# Patient Record
Sex: MALE | Race: WHITE
[De-identification: names, ages, dates, MRNs, and addresses within clinical notes are randomized per-mention and may not be internally consistent; named-entity substitution may affect disease eponyms.]

---

## 2018-03-21 ENCOUNTER — HOSPITAL ENCOUNTER (OUTPATIENT)
Dept: HOSPITAL 17 - PHED | Age: 63
Setting detail: OBSERVATION
LOS: 2 days | Discharge: HOME | End: 2018-03-23
Attending: HOSPITALIST | Admitting: HOSPITALIST
Payer: COMMERCIAL

## 2018-03-21 VITALS — WEIGHT: 135.58 LBS | BODY MASS INDEX: 24.02 KG/M2 | HEIGHT: 63 IN

## 2018-03-21 VITALS
OXYGEN SATURATION: 98 % | SYSTOLIC BLOOD PRESSURE: 140 MMHG | RESPIRATION RATE: 18 BRPM | HEART RATE: 66 BPM | TEMPERATURE: 97.3 F | DIASTOLIC BLOOD PRESSURE: 83 MMHG

## 2018-03-21 VITALS
HEART RATE: 66 BPM | OXYGEN SATURATION: 98 % | SYSTOLIC BLOOD PRESSURE: 124 MMHG | RESPIRATION RATE: 18 BRPM | DIASTOLIC BLOOD PRESSURE: 78 MMHG

## 2018-03-21 VITALS — OXYGEN SATURATION: 98 %

## 2018-03-21 VITALS
OXYGEN SATURATION: 97 % | RESPIRATION RATE: 18 BRPM | SYSTOLIC BLOOD PRESSURE: 126 MMHG | DIASTOLIC BLOOD PRESSURE: 89 MMHG | HEART RATE: 66 BPM

## 2018-03-21 DIAGNOSIS — R13.10: ICD-10-CM

## 2018-03-21 DIAGNOSIS — E78.00: ICD-10-CM

## 2018-03-21 DIAGNOSIS — R22.1: ICD-10-CM

## 2018-03-21 DIAGNOSIS — Z82.49: ICD-10-CM

## 2018-03-21 DIAGNOSIS — R94.31: ICD-10-CM

## 2018-03-21 DIAGNOSIS — E78.5: ICD-10-CM

## 2018-03-21 DIAGNOSIS — K14.8: ICD-10-CM

## 2018-03-21 DIAGNOSIS — F17.290: ICD-10-CM

## 2018-03-21 DIAGNOSIS — I45.9: ICD-10-CM

## 2018-03-21 DIAGNOSIS — K08.89: ICD-10-CM

## 2018-03-21 DIAGNOSIS — Z83.3: ICD-10-CM

## 2018-03-21 DIAGNOSIS — I63.9: Primary | ICD-10-CM

## 2018-03-21 DIAGNOSIS — M54.2: ICD-10-CM

## 2018-03-21 LAB
BASOPHILS # BLD AUTO: 0.1 TH/MM3 (ref 0–0.2)
BASOPHILS NFR BLD: 1.4 % (ref 0–2)
BUN SERPL-MCNC: 6 MG/DL (ref 7–18)
CALCIUM SERPL-MCNC: 8 MG/DL (ref 8.5–10.1)
CHLORIDE SERPL-SCNC: 98 MEQ/L (ref 98–107)
COLOR UR: YELLOW
CREAT SERPL-MCNC: 0.72 MG/DL (ref 0.6–1.3)
EOSINOPHIL # BLD: 0.1 TH/MM3 (ref 0–0.4)
EOSINOPHIL NFR BLD: 1.3 % (ref 0–4)
ERYTHROCYTE [DISTWIDTH] IN BLOOD BY AUTOMATED COUNT: 11.9 % (ref 11.6–17.2)
GFR SERPLBLD BASED ON 1.73 SQ M-ARVRAT: 110 ML/MIN (ref 89–?)
GLUCOSE SERPL-MCNC: 79 MG/DL (ref 74–106)
GLUCOSE UR STRIP-MCNC: (no result) MG/DL
HCO3 BLD-SCNC: 26.8 MEQ/L (ref 21–32)
HCT VFR BLD CALC: 40.9 % (ref 39–51)
HGB BLD-MCNC: 14.1 GM/DL (ref 13–17)
HGB UR QL STRIP: (no result)
INR PPP: 1 RATIO
KETONES UR STRIP-MCNC: (no result) MG/DL
LYMPHOCYTES # BLD AUTO: 1.9 TH/MM3 (ref 1–4.8)
LYMPHOCYTES NFR BLD AUTO: 38.3 % (ref 9–44)
MCH RBC QN AUTO: 31.7 PG (ref 27–34)
MCHC RBC AUTO-ENTMCNC: 34.4 % (ref 32–36)
MCV RBC AUTO: 92.4 FL (ref 80–100)
MONOCYTE #: 0.4 TH/MM3 (ref 0–0.9)
MONOCYTES NFR BLD: 8 % (ref 0–8)
NEUTROPHILS # BLD AUTO: 2.6 TH/MM3 (ref 1.8–7.7)
NEUTROPHILS NFR BLD AUTO: 51 % (ref 16–70)
NITRITE UR QL STRIP: (no result)
PLATELET # BLD: 503 TH/MM3 (ref 150–450)
PMV BLD AUTO: 6.4 FL (ref 7–11)
PROTHROMBIN TIME: 10.6 SEC (ref 9.8–11.6)
RBC # BLD AUTO: 4.43 MIL/MM3 (ref 4.5–5.9)
SODIUM SERPL-SCNC: 132 MEQ/L (ref 136–145)
SP GR UR STRIP: (no result) (ref 1–1.03)
SQUAMOUS #/AREA URNS HPF: (no result) /HPF (ref 0–5)
TROPONIN I SERPL-MCNC: (no result) NG/ML (ref 0.02–0.05)
URINE LEUKOCYTE ESTERASE: (no result)
WBC # BLD AUTO: 5.1 TH/MM3 (ref 4–11)

## 2018-03-21 PROCEDURE — 81001 URINALYSIS AUTO W/SCOPE: CPT

## 2018-03-21 PROCEDURE — 71045 X-RAY EXAM CHEST 1 VIEW: CPT

## 2018-03-21 PROCEDURE — 70496 CT ANGIOGRAPHY HEAD: CPT

## 2018-03-21 PROCEDURE — 96365 THER/PROPH/DIAG IV INF INIT: CPT

## 2018-03-21 PROCEDURE — 96361 HYDRATE IV INFUSION ADD-ON: CPT

## 2018-03-21 PROCEDURE — 85730 THROMBOPLASTIN TIME PARTIAL: CPT

## 2018-03-21 PROCEDURE — G0378 HOSPITAL OBSERVATION PER HR: HCPCS

## 2018-03-21 PROCEDURE — 70498 CT ANGIOGRAPHY NECK: CPT

## 2018-03-21 PROCEDURE — 70450 CT HEAD/BRAIN W/O DYE: CPT

## 2018-03-21 PROCEDURE — 84425 ASSAY OF VITAMIN B-1: CPT

## 2018-03-21 PROCEDURE — 86592 SYPHILIS TEST NON-TREP QUAL: CPT

## 2018-03-21 PROCEDURE — 82607 VITAMIN B-12: CPT

## 2018-03-21 PROCEDURE — 84439 ASSAY OF FREE THYROXINE: CPT

## 2018-03-21 PROCEDURE — 99285 EMERGENCY DEPT VISIT HI MDM: CPT

## 2018-03-21 PROCEDURE — 80061 LIPID PANEL: CPT

## 2018-03-21 PROCEDURE — 92611 MOTION FLUOROSCOPY/SWALLOW: CPT

## 2018-03-21 PROCEDURE — 83036 HEMOGLOBIN GLYCOSYLATED A1C: CPT

## 2018-03-21 PROCEDURE — 86038 ANTINUCLEAR ANTIBODIES: CPT

## 2018-03-21 PROCEDURE — 86140 C-REACTIVE PROTEIN: CPT

## 2018-03-21 PROCEDURE — 70491 CT SOFT TISSUE NECK W/DYE: CPT

## 2018-03-21 PROCEDURE — 82550 ASSAY OF CK (CPK): CPT

## 2018-03-21 PROCEDURE — 85652 RBC SED RATE AUTOMATED: CPT

## 2018-03-21 PROCEDURE — 70551 MRI BRAIN STEM W/O DYE: CPT

## 2018-03-21 PROCEDURE — 85610 PROTHROMBIN TIME: CPT

## 2018-03-21 PROCEDURE — 74230 X-RAY XM SWLNG FUNCJ C+: CPT

## 2018-03-21 PROCEDURE — 84443 ASSAY THYROID STIM HORMONE: CPT

## 2018-03-21 PROCEDURE — 85025 COMPLETE CBC W/AUTO DIFF WBC: CPT

## 2018-03-21 PROCEDURE — 96376 TX/PRO/DX INJ SAME DRUG ADON: CPT

## 2018-03-21 PROCEDURE — 93005 ELECTROCARDIOGRAM TRACING: CPT

## 2018-03-21 PROCEDURE — 82150 ASSAY OF AMYLASE: CPT

## 2018-03-21 PROCEDURE — 80048 BASIC METABOLIC PNL TOTAL CA: CPT

## 2018-03-21 PROCEDURE — 84484 ASSAY OF TROPONIN QUANT: CPT

## 2018-03-21 NOTE — RADRPT
EXAM DATE/TIME:  03/21/2018 22:06 

 

[facility] COMPARISON:     

No previous studies available for comparison.

 

 

INDICATIONS :     

Neck pain, difficulty speaking.

                      

 

RADIATION DOSE:     

62.06 CTDIvol (mGy) 

 

 

 

MEDICAL HISTORY :     

None  

 

SURGICAL HISTORY :      

None. 

 

ENCOUNTER:      

Initial

 

ACUITY:      

1 day

 

PAIN SCALE:      

5/10

 

LOCATION:        

neck 

 

TECHNIQUE:     

Multiple contiguous axial images were obtained of the head.  Using automated exposure control and adj
ustment of the mA and/or kV according to patient size, radiation dose was kept as low as reasonably a
chievable to obtain optimal diagnostic quality images.   DICOM format image data is available electro
nically for review and comparison.  

 

FINDINGS:     

 

CEREBRUM:     

The ventricles are normal for age.  No evidence of midline shift, mass lesion, hemorrhage or acute in
farction.  No extra-axial fluid collections are seen.

 

POSTERIOR FOSSA:     

The cerebellum and brainstem are intact.  The 4th ventricle is midline.  The cerebellopontine angle i
s unremarkable.

 

EXTRACRANIAL:     

The visualized portion of the orbits is intact.

 

SKULL:     

The calvaria is intact.  No evidence of skull fracture.

 

CONCLUSION:     

1. No acute intracranial abnormalities.

 

 

 

 Srinivasa Serna MD on March 21, 2018 at 22:35           

Board Certified Radiologist.

 This report was verified electronically.

## 2018-03-21 NOTE — EKG
Date Performed: 03/21/2018       Time Performed: 20:42:07

 

PTAGE:      62 years

 

EKG:      Sinus rhythm 

 

 POSSIBLE LEFT ATRIAL ENLARGEMENT POSSIBLE RIGHT VENTRICULAR CONDUCTION DELAY BORDERLINE ECG 

 

NO PREVIOUS TRACING            

 

DOCTOR:   Hema Pdero  Interpretating Date/Time  03/21/2018 21:36:14

## 2018-03-21 NOTE — RADRPT
EXAM DATE/TIME:  03/21/2018 22:06 

 

[facility] COMPARISON:     

CTA BRAIN W 3D RECON, March 21, 2018, 22:06.

 

 

INDICATIONS :     

Neck pain, difficulty speaking.

                      

 

IV CONTRAST:     

75 cc Omnipaque 350 (iohexol) IV ; Cumulative dose for multiple exams.

 

 

RADIATION DOSE:     

43.07 CTDIvol (mGy) ; Combined studies

 

 

MEDICAL HISTORY :     

None  

 

SURGICAL HISTORY :      

None. 

 

ENCOUNTER:      

Initial

 

ACUITY:      

1 day

 

PAIN SCALE:      

7/10

 

LOCATION:        

neck 

Elevated flow velocities and ICA/CCA ratios have been found to correlate with increased degrees of 

vessel stenosis, calculated as percentage of diameter relative to a normal segment of distal ICA/CCA.


 

TECHNIQUE:     

Volumetric scanning was performed using a multirow detector CT scanner.  The data was post processed 
with a variety of visualization algorithms including full-volume maximum intensity projection, multip
lanar sliding thin-slab reformation, curved-planar reformation, and surface-rendering techniques.  Us
ing automated exposure control and adjustment of the mA and/or kV according to patient size, radiatio
n dose was kept as low as reasonably achievable to obtain optimal diagnostic quality images.  DICOM f
ormat image data is available electronically for review and comparison.  

 

FINDINGS:     

 

AORTIC ARCH:     

Not visualized due to obscuration from contrast bolus in the left subclavian

 

RIGHT CAROTID:     

The common carotid artery is intact. The carotid bulb has a normal configuration without ulceration o
r narrowing. The internal carotid artery lumen is smooth without stenosis.  The external carotid rasta
ry is intact.

 

LEFT CAROTID:     

The common carotid artery is intact.  The carotid bulb has a normal configuration without ulceration 
or narrowing.  The internal carotid artery lumen is smooth without stenosis.  The external carotid ar
bridgette is intact.

 

VERTEBRALS:     

Normal dimensions of the left vertebral artery.  No contrast seen in the right vertebral.

 

CONCLUSION:     

1. No stenosis seen in the carotids or left vertebral.

2. No contrast seen in the right vertebral artery.

 

 

 

 Joseph Magallanes MD on March 21, 2018 at 23:07           

Board Certified Radiologist.

 This report was verified electronically.

## 2018-03-21 NOTE — RADRPT
EXAM DATE/TIME:  03/21/2018 22:06 

 

[facility] COMPARISON:     

No previous studies available for comparison.

 

 

INDICATIONS :     

Neck pain, difficulty speaking.

                      

 

IV CONTRAST:     

75  Omnipaque 350 (iohexol) IV ; Cumulative dose for multiple exams.

                      

 

RADIATION DOSE:     

43.07 CTDIvol (mGy) 

 

 

MEDICAL HISTORY :     

None  

 

SURGICAL HISTORY :      

None. 

 

ENCOUNTER:      

Initial

 

ACUITY:      

1 day

 

PAIN SCALE:      

7/10

 

LOCATION:        

neck 

 

TECHNIQUE:     

Volumetric scanning was performed using a multi-row detector CT scanner.  The data was post processed
 with a variety of visualization algorithms including full volume maximum intensity projection, multi
-planar sliding thin slab reformation, curved planar reformation, and surface rendering techniques.  
Using automated exposure control and adjustment of the mA and/or kV according to patient size, radiat
ion dose was kept as low as reasonably achievable to obtain optimal diagnostic quality images.   DICO
M format image data is available electronically for review and comparison.  

 

FINDINGS:     

There is excellent visualization of the major intracranial arteries out to the second-order branch ve
ssels.  There is no evidence for aneurysm, vessel truncation or stenosis, and no evidence for vascula
r malformation.

 

 

CONCLUSION:     

Normal examination for a patient of this age.  

 

 

 

 Srinivasa Serna MD on March 21, 2018 at 22:39           

Board Certified Radiologist.

 This report was verified electronically.

## 2018-03-21 NOTE — RADRPT
EXAM DATE/TIME:  03/21/2018 21:00 

 

[facility] COMPARISON:     

No previous studies available for comparison.

 

                     

INDICATIONS :     

Right sided head and neck pain, slurred speech starting this morning

                     

 

MEDICAL HISTORY :     

Hypercholesterolemia.          

 

SURGICAL HISTORY :     

None.   

 

ENCOUNTER:     

Initial                                        

 

ACUITY:     

1 day      

 

PAIN SCORE:     

0/10

 

LOCATION:     

Bilateral chest 

 

FINDINGS:     

A single view of the chest demonstrates the lungs to be symmetrically aerated without evidence of mas
s, infiltrate or effusion.  The cardiomediastinal contours are unremarkable.  Osseous structures are 
intact.

 

CONCLUSION:     

1. No acute findings. Calcified granuloma left midlung.

 

 

 

 Srinivasa Serna MD on March 21, 2018 at 21:26           

Board Certified Radiologist.

 This report was verified electronically.

## 2018-03-22 VITALS — SYSTOLIC BLOOD PRESSURE: 119 MMHG | DIASTOLIC BLOOD PRESSURE: 81 MMHG

## 2018-03-22 VITALS
DIASTOLIC BLOOD PRESSURE: 78 MMHG | OXYGEN SATURATION: 95 % | SYSTOLIC BLOOD PRESSURE: 117 MMHG | HEART RATE: 68 BPM | RESPIRATION RATE: 13 BRPM | TEMPERATURE: 98.6 F

## 2018-03-22 VITALS
DIASTOLIC BLOOD PRESSURE: 81 MMHG | OXYGEN SATURATION: 97 % | HEART RATE: 70 BPM | TEMPERATURE: 98.2 F | SYSTOLIC BLOOD PRESSURE: 114 MMHG | RESPIRATION RATE: 12 BRPM

## 2018-03-22 VITALS — HEART RATE: 74 BPM

## 2018-03-22 VITALS
TEMPERATURE: 99.3 F | RESPIRATION RATE: 21 BRPM | HEART RATE: 80 BPM | SYSTOLIC BLOOD PRESSURE: 133 MMHG | OXYGEN SATURATION: 97 % | DIASTOLIC BLOOD PRESSURE: 86 MMHG

## 2018-03-22 VITALS — OXYGEN SATURATION: 98 %

## 2018-03-22 VITALS
TEMPERATURE: 97.9 F | DIASTOLIC BLOOD PRESSURE: 70 MMHG | SYSTOLIC BLOOD PRESSURE: 128 MMHG | HEART RATE: 78 BPM | OXYGEN SATURATION: 96 % | RESPIRATION RATE: 19 BRPM

## 2018-03-22 VITALS
HEART RATE: 66 BPM | TEMPERATURE: 97.5 F | DIASTOLIC BLOOD PRESSURE: 85 MMHG | RESPIRATION RATE: 15 BRPM | OXYGEN SATURATION: 98 % | SYSTOLIC BLOOD PRESSURE: 114 MMHG

## 2018-03-22 VITALS — OXYGEN SATURATION: 97 %

## 2018-03-22 VITALS
HEART RATE: 69 BPM | SYSTOLIC BLOOD PRESSURE: 125 MMHG | RESPIRATION RATE: 16 BRPM | DIASTOLIC BLOOD PRESSURE: 81 MMHG | OXYGEN SATURATION: 97 %

## 2018-03-22 VITALS
HEART RATE: 72 BPM | TEMPERATURE: 97.9 F | RESPIRATION RATE: 14 BRPM | DIASTOLIC BLOOD PRESSURE: 82 MMHG | OXYGEN SATURATION: 96 % | SYSTOLIC BLOOD PRESSURE: 130 MMHG

## 2018-03-22 LAB
CHOLEST SERPL-MCNC: 164 MG/DL (ref 120–200)
CHOLESTEROL/ HDL RATIO: 2.5 RATIO
CRP SERPL-MCNC: (no result) MG/DL (ref 0–0.3)
HBA1C MFR BLD: 5.7 % (ref 4.3–6)
HDLC SERPL-MCNC: 65.4 MG/DL (ref 40–60)
LDLC SERPL-MCNC: 73 MG/DL (ref 0–99)
T4 FREE SERPL-MCNC: 1.03 NG/DL (ref 0.76–1.46)
TRIGL SERPL-MCNC: 126 MG/DL (ref 42–150)
VIT B12 SERPL-MCNC: 431 PG/ML (ref 193–986)

## 2018-03-22 RX ADMIN — Medication SCH ML: at 20:15

## 2018-03-22 RX ADMIN — CLINDAMYCIN PHOSPHATE SCH MLS/HR: 900 INJECTION, SOLUTION INTRAVENOUS at 20:15

## 2018-03-22 RX ADMIN — Medication SCH ML: at 09:26

## 2018-03-22 RX ADMIN — HYDROCODONE BITARTRATE AND ACETAMINOPHEN PRN TAB: 5; 325 TABLET ORAL at 16:33

## 2018-03-22 RX ADMIN — PHENYTOIN SODIUM SCH MLS/HR: 50 INJECTION INTRAMUSCULAR; INTRAVENOUS at 11:03

## 2018-03-22 RX ADMIN — IBUPROFEN PRN MG: 200 TABLET, FILM COATED ORAL at 16:33

## 2018-03-22 NOTE — PD.CONS
Assessment and Plan


Assessment


Consult received per stroke order set.  EMR reviewed.





MRI negative for acute stroke.  Neurology consult reviewed.





Consult deferred due to no acute stroke.  Please reconsult as appropriate.





Thank you.











Leeanna Lepe MD Mar 22, 2018 14:33

## 2018-03-22 NOTE — HHI.HP
__________________________________________________





HPI


Service


[facility]


Primary Care Physician


Non-Staff


Admission Diagnosis





CVA


Diagnoses:  


Chief Complaint:  


Slurred speech


Travel History


International Travel<30 Days:  No


Contact w/Intl Traveler <30 Da:  No


Traveled to Known Affected Are:  No


History of Present Illness


62-year-old male with history of hyperlipidemia who presented to the emergency 

room with complaints of slurred speech that started Saturday morning.  He 

denies any fever or chills.  He was evaluated by neurology Dr. Miller, 

imaging are normal, neurology recommends ENT evaluation.  CT soft tissue for 

further evaluation.  However patient received IV, contrast, and at this time he 

receiving IV fluids will have CT neck tomorrow.  Patient also had modified 

barium test and was normal he can eat regular food at this time.


Patient denies any change in vision, problem with respiration, chest pain, 

shortness of breath, palpitations.  No nausea or vomiting no diarrhea or 

constipation.


Patient is saturating well on room air.  There is no airway compromise and he 

is able to eat this time.





Review of Systems


Except as stated in HPI:  all other systems reviewed are Neg





Past Family Social History


Past Medical History


Hyperlipidemia


Past Surgical History


Left inguinal hernia repair


Reported Medications





Reported Meds & Active Scripts


Active


Reported


Pravastatin 80 Mg Tab 80 Mg PO DAILY


Allergies:  


Coded Allergies:  


     No Known Allergies (Unverified , 3/21/18)


Family History


Father with heart problems


Mother with diabetes and heart problems


Social History


Smokes cigars 1 cigar a month.


Occasional alcohol use


No illicit drug use





Physical Exam


Vital Signs





Vital Signs








  Date Time  Temp Pulse Resp B/P (MAP) Pulse Ox O2 Delivery O2 Flow Rate FiO2


 


3/22/18 12:00 99.3 80 21 133/86 (102) 97   


 


3/22/18 12:00  80      


 


3/22/18 08:00 97.9 72 14 130/82 (98) 96   


 


3/22/18 08:00  72      


 


3/22/18 04:00  68      


 


3/22/18 04:00 98.6 68 13 117/78 (91) 95   


 


3/22/18 02:00 97.5 66 15 114/85 (95) 98   


 


3/22/18 02:00  66      


 


3/22/18 01:45  74 16 119/81 (94) 96   


 


3/22/18 01:07     97   21


 


3/22/18 00:57  69 16 125/81 (96) 97 Room Air  


 


3/22/18 00:52      Room Air  


 


3/21/18 22:32  66 18 126/89 (101) 97 Room Air  


 


3/21/18 20:54  66 18 124/78 (93) 98 Room Air  


 


3/21/18 20:36     98 Room Air  


 


3/21/18 20:04 97.3 66 18 140/83 (102) 98   








Physical Exam


GENERAL: This is a well-nourished, well-developed patient, in no apparent 

distress.


SKIN: No rashes, ecchymoses or lesions. Cool and dry.


HEAD: Atraumatic. Normocephalic. No temporal or scalp tenderness.


EYES: Pupils equal round and reactive. Extraocular motions intact. No scleral 

icterus. No injection or drainage. 


ENT: Nose without bleeding, purulent drainage or septal hematoma. Throat 

without erythema.  Right side deviation of the tongue also right side of the 

tongue appears enlarged when compared to the side.  Airway patent.


NECK: Trachea midline. Supple.  Tenderness palpation of the right side of the 

neck.


CARDIOVASCULAR: Regular rate and rhythm without murmurs, gallops, or rubs. 


RESPIRATORY: Clear to auscultation. Breath sounds equal bilaterally. No wheezes

, rales, or rhonchi.  


GASTROINTESTINAL: Abdomen soft, non-tender, nondistended. No hepato-splenomegaly

, or palpable masses. No guarding.


MUSCULOSKELETAL: Extremities without clubbing, cyanosis, or edema. No joint 

tenderness, effusion, or edema noted. No calf tenderness. Negative Homans sign 

bilaterally.


NEUROLOGICAL: Awake and alert. Cranial nerves II through XII intact.  Motor and 

sensory grossly within normal limits. Five out of 5 muscle strength in all 

muscle groups.  Normal speech.


Laboratory





Laboratory Tests








Test


  3/21/18


20:50 3/22/18


04:55 3/22/18


10:45


 


White Blood Count 5.1   


 


Red Blood Count 4.43   


 


Hemoglobin 14.1   


 


Hematocrit 40.9   


 


Mean Corpuscular Volume 92.4   


 


Mean Corpuscular Hemoglobin 31.7   


 


Mean Corpuscular Hemoglobin


Concent 34.4 


  


  


 


 


Red Cell Distribution Width 11.9   


 


Platelet Count 503   


 


Mean Platelet Volume 6.4   


 


Neutrophils (%) (Auto) 51.0   


 


Lymphocytes (%) (Auto) 38.3   


 


Monocytes (%) (Auto) 8.0   


 


Eosinophils (%) (Auto) 1.3   


 


Basophils (%) (Auto) 1.4   


 


Neutrophils # (Auto) 2.6   


 


Lymphocytes # (Auto) 1.9   


 


Monocytes # (Auto) 0.4   


 


Eosinophils # (Auto) 0.1   


 


Basophils # (Auto) 0.1   


 


CBC Comment DIFF FINAL   


 


Differential Comment    


 


Prothrombin Time 10.6   


 


Prothromb Time International


Ratio 1.0 


  


  


 


 


Activated Partial


Thromboplast Time 26.4 


  


  


 


 


Urine Color YELLOW   


 


Urine Turbidity CLEAR   


 


Urine pH 5.0   


 


Urine Specific Gravity


  LESS/EQUAL


1.005 


  


 


 


Urine Protein NEG   


 


Urine Glucose (UA) NEG   


 


Urine Ketones NEG   


 


Urine Occult Blood NEG   


 


Urine Nitrite NEG   


 


Urine Bilirubin NEG   


 


Urine Urobilinogen 0.2   


 


Urine Leukocyte Esterase NEG   


 


Urine Squamous Epithelial


Cells 0-5 


  


  


 


 


Blood Urea Nitrogen 6   


 


Creatinine 0.72   


 


Random Glucose 79   


 


Calcium Level 8.0   


 


Sodium Level 132   


 


Potassium Level 4.1   


 


Chloride Level 98   


 


Carbon Dioxide Level 26.8   


 


Anion Gap 7   


 


Estimat Glomerular Filtration


Rate 110 


  


  


 


 


Total Creatine Kinase 178   128 


 


Troponin I LESS THAN 0.02   


 


Triglycerides Level  126  


 


Cholesterol Level  164  


 


LDL Cholesterol  73  


 


HDL Cholesterol  65.4  


 


Cholesterol/HDL Ratio  2.50  


 


Erythrocyte Sedimentation Rate   1 


 


C-Reactive Protein   LESS THAN 0.29 


 


Amylase Level   71 


 


Vitamin B12 Level   431 


 


Free Thyroxine   1.03 


 


Thyroid Stimulating Hormone


3rd Gen 


  


  1.360 


 








Result Diagram:  


3/21/18 2050                                                                   

             3/21/18 2050





Imaging





Last Impressions








Brain MRI 3/22/18 0000 Signed





Impressions: 





 Service Date/Time:   00:16 - CONCLUSION:  1. Negative 





 MRI of the brain.  No evidence of acute stroke.     Joseph Magallanes MD 


 


Neck CTA 3/21/18 2034 Signed





Impressions: 





 Service Date/Time:  2018 22:06 - CONCLUSION:  1. No 





 stenosis seen in the carotids or left vertebral. 2. No contrast seen in the 





 right vertebral artery.     Joseph Magallanes MD 


 


Head CTA 3/21/18 2034 Signed





Impressions: 





 Service Date/Time:  2018 22:06 - CONCLUSION:  Normal 





 examination for a patient of this age.       Srinivasa Serna MD 


 


Head CT 3/21/18 2034 Signed





Impressions: 





 Service Date/Time:  2018 22:06 - CONCLUSION:  1. No acute 





 intracranial abnormalities.     Srinivasa Serna MD 


 


Chest X-Ray 3/21/18 2034 Signed





Impressions: 





 Service Date/Time:  2018 21:00 - CONCLUSION:  1. No acute 





 findings. Calcified granuloma left midlung.     Joseph E. Cox, MD Caprini VTE Risk Assessment


Caprini VTE Risk Assessment:  Mod/High Risk (score >= 2)


Caprini Risk Assessment Model











 Point Value = 1          Point Value = 2  Point Value = 3  Point Value = 5


 


Age 41-60


Minor surgery


BMI > 25 kg/m2


Swollen legs


Varicose veins


Pregnancy or postpartum


History of unexplained or recurrent


   spontaneous 


Oral contraceptives or hormone


   replacement


Sepsis (< 1 month)


Serious lung disease, including


   pneumonia (< 1 month)


Abnormal pulmonary function


Acute myocardial infarction


Congestive heart failure (< 1 month)


History of inflammatory bowel disease


Medical patient at bed rest Age 61-74


Arthroscopic surgery


Major open surgery (> 45 min)


Laparoscopic surgery (> 45 min)


Malignancy


Confined to bed (> 72 hours)


Immobilizing plaster cast


Central venous access Age >= 75


History of VTE


Family history of VTE


Factor V Leiden


Prothrombin 85802R


Lupus anticoagulant


Anticardiolipin antibodies


Elevated serum homocysteine


Heparin-induced thrombocytopenia


Other congenital or acquired


   thrombophilia Stroke (< 1 month)


Elective arthroplasty


Hip, pelvis, or leg fracture


Acute spinal cord injury (< 1 month)








Prophylaxis Regimen











   Total Risk


Factor Score Risk Level Prophylaxis Regimen


 


0-1      Low Early ambulation


 


2 Moderate Order ONE of the following:


*Sequential Compression Device (SCD)


*Heparin 5000 units SQ BID


 


3-4 Higher Order ONE of the following medications:


*Heparin 5000 units SQ TID


*Enoxaparin/Lovenox 40 mg SQ daily (WT < 150 kg, CrCl > 30 mL/min)


*Enoxaparin/Lovenox 30 mg SQ daily (WT < 150 kg, CrCl > 10-29 mL/min)


*Enoxaparin/Lovenox 30 mg SQ BID (WT < 150 kg, CrCl > 30 mL/min)


AND/OR


*Sequential Compression Device (SCD)


 


5 or more Highest Order ONE of the following medications:


*Heparin 5000 units SQ TID (Preferred with Epidurals)


*Enoxaparin/Lovenox 40 mg SQ daily (WT < 150 kg, CrCl > 30 mL/min)


*Enoxaparin/Lovenox 30 mg SQ daily (WT < 150 kg, CrCl > 10-29 mL/min)


*Enoxaparin/Lovenox 30 mg SQ BID (WT < 150 kg, CrCl > 30 mL/min)


AND


*Sequential Compression Device (SCD)











Assessment and Plan


Assessment and Plan


62-year-old male





Slurred speech on admission/Odynophagia/dysphagia. Improved 


Tongue deviation to the right, possible right neck mass





CT head/ MRI/MRA head reviewed and findings discussed with neurology Dr Miller no evidence of acute ischemic stroke


Plan to evaluate right neck  with CT neck with contrast and cant be done today 

as patient had already contrast today. Continue IVF. 


Past swallow test and patient can have regular food


Neurochecks


Hyperlipidemia continue home medication


Pain medications per pain scale


Started on clyndamycin IV 





Possible discharge tomorrow after CT neck. Discussed with Dr Dee  ENT can 

follow up with him s OP on Tuesday in his office


Discussed Condition With


pt, nurse. family at bedside. Dr Miller neuro , Dr Dee ENT











Yanni Garza MD Mar 22, 2018 15:42

## 2018-03-22 NOTE — RADRPT
EXAM DATE/TIME:  03/22/2018 22:14 

 

[facility] COMPARISON:     

No previous studies available for comparison.

 

 

INDICATIONS :     

Evaluate right neck mass. 

                      

 

IV CONTRAST:     

75 cc Omnipaque 350 (iohexol) IV 

                      

 

RADIATION DOSE:     

12.0 CTDIvol (mGy) 

 

 

MEDICAL HISTORY :     

None  

 

SURGICAL HISTORY :      

Cholecystectomy. 

 

ENCOUNTER:      

Initial

 

ACUITY:      

1 day

 

PAIN SCALE:      

5/10

 

LOCATION:       

Right neck 

 

TECHNIQUE:     

Volumetric scanning of the neck was performed.  Using automated exposure control and adjustment of th
e mA and/or kV according to patient size, radiation dose was kept as low as reasonably achievable to 
obtain optimal diagnostic quality images.   DICOM format image data is available electronically for r
eview and comparison.  

 

FINDINGS:     

No acute bony abnormalities. Paranasal sinuses are clear. No pathologically enlarged lymph nodes are 
seen in the neck. The parotid glands and submandibular glands are symmetric.

 

There does appear to be a mass in the posterior right lingual region effacing the right vallecula. Ma
rgins of the mass are not clearly defined. This area is partially obscured by dental hardware.

 

No acute bony abnormalities.

 

CONCLUSION:     

1. Posterior right lingual mass with margins not clearly defined on CT. This area is partially obscur
ed by dental hardware. Direct visualization recommended.

 

 

 

 Srinivasa Serna MD on March 22, 2018 at 22:53           

Board Certified Radiologist.

 This report was verified electronically.

## 2018-03-22 NOTE — MB
cc:

Patricio Miller MD

****

 

 

DATE:

03/22/2018

 

HISTORY OF PRESENT ILLNESS:

A 62-year-old right-handed man with a history of hypercholesterolemia.

 Otherwise has been very healthy and yesterday he woke up, felt like 

his speech was a little slurred, still feels that way.  He has had 

some pain in the submandibular region and a little bit in the right 

cervical region.  It was noted that his right face looked a little bit

puffy and it was noted that his tongue deviates over to the right and 

he was admitted to the hospital for possible medullary stroke, but the

MRI does not show a stroke in that region.  There was no tingling or 

numbness.

 

He has not been running a fever.  Has difficulty swallowing, he tells 

me.  MRI of the brain has been negative for a stroke.  CTA negative of

the neck and [place].  I reviewed those films.

 

REVIEW OF SYSTEMS:  He denies any history of hypertension, diabetes, 

MI, stent, angioplasty, AFib, Coumadin, renal, hepatic, pulmonary 

disease, thyroid disease, lupus, ulcer, cancer, seizure or stroke.

 

SOCIAL HISTORY:

Nonsmoker.  Has about 6 beers a day.  Lives with his wife.  Does not 

chew tobacco.

 

FAMILY HISTORY:

Negative for cancer, seizure, stroke.  Positive for MI in his father.

 

LABORATORY STUDIES:

EKG shows sinus rhythm.

 

MEDICATIONS:

Pravastatin.  No new medicines recently.

 

ALLERGIES:

NO KNOWN DRUG ALLERGIES.

 

PHYSICAL EXAMINATION:

Afebrile, 72, 14, 130/82.  There were no carotid bruits.

HEART:

Regular rhythm.  I do not detect a murmur.

NEUROLOGIC:

Pupils were equal.  Visual fields were full.  Extraocular movements 

intact without nystagmus.  Face moves symmetrically with normal 

sensation.  Tongue does deviate over to the right, although the right 

side of the tongue is actually larger.  There may be some slight 

diminished movement of the right palate.  He has some mild tenderness 

in the right submandibular region.  Neck itself is nontender.  Parotid

may be slightly swollen, as there is some slight swelling to the right

side of his face compared to the left.  I did not see any abscesses or

pus in the back of the throat.  There is no drift.  He had normal 

strength in upper and lower extremities bilaterally.  DTRs are 2+ and 

symmetric throughout.  Toes downgoing bilaterally.  Pinprick is intact

throughout.  He is not ataxic on finger-to-nose.  Speech is fluent.  

He is not aphasic.  A little bit of trouble with lingual sounds, but 

his buccal sounds and pharyngeal sounds are normal.

 

LABORATORY DATA:

CBC is normal.  UA is negative.  Basic metabolic profile:  Sodium 132,

otherwise normal.  CPK is normal.  LDL is pending.  MRI of the brain 

was normal.  Medulla looks fine.  CTAs are negative.  Chest x-ray, 

calcified granuloma, left lung.  Head CT was read as negative.  I do 

not see anything definite on the lower part of the skull.  No infarcts

are noted.  Right mastoid looks clear.  Really do not get down into 

the parotid region on the MR.

 

IMPRESSION:

Some swelling on the right side of the face.  This does not appear to 

be primary neurological.  I would have ear, nose, and throat see the 

patient, get a CAT scan through the base of the skull.  Check a 

sedimentation rate.  If they clear him he could be discharged, but 

appears to be primary non-neurological.  Can check an amylase level 

and we can do a swallow study on the patient.

 

 

__________________________________

Patricio Miller MD

 

 

DJM/TI

D: 03/22/2018, 10:16 AM

T: 03/22/2018, 10:46 AM

Visit #: [id number]

Job #: [id number]

## 2018-03-23 VITALS
RESPIRATION RATE: 31 BRPM | DIASTOLIC BLOOD PRESSURE: 76 MMHG | SYSTOLIC BLOOD PRESSURE: 117 MMHG | HEART RATE: 70 BPM | OXYGEN SATURATION: 98 %

## 2018-03-23 VITALS
DIASTOLIC BLOOD PRESSURE: 65 MMHG | RESPIRATION RATE: 13 BRPM | HEART RATE: 64 BPM | OXYGEN SATURATION: 95 % | TEMPERATURE: 98.3 F | SYSTOLIC BLOOD PRESSURE: 115 MMHG

## 2018-03-23 VITALS
SYSTOLIC BLOOD PRESSURE: 134 MMHG | OXYGEN SATURATION: 96 % | TEMPERATURE: 98.4 F | HEART RATE: 68 BPM | DIASTOLIC BLOOD PRESSURE: 81 MMHG | RESPIRATION RATE: 19 BRPM

## 2018-03-23 VITALS
SYSTOLIC BLOOD PRESSURE: 109 MMHG | TEMPERATURE: 98.5 F | OXYGEN SATURATION: 95 % | DIASTOLIC BLOOD PRESSURE: 65 MMHG | HEART RATE: 58 BPM

## 2018-03-23 VITALS
RESPIRATION RATE: 21 BRPM | HEART RATE: 64 BPM | OXYGEN SATURATION: 98 % | TEMPERATURE: 98.2 F | DIASTOLIC BLOOD PRESSURE: 70 MMHG | SYSTOLIC BLOOD PRESSURE: 109 MMHG

## 2018-03-23 VITALS — RESPIRATION RATE: 19 BRPM | HEART RATE: 70 BPM

## 2018-03-23 VITALS — OXYGEN SATURATION: 96 %

## 2018-03-23 LAB — ANA SER QL: (no result)

## 2018-03-23 RX ADMIN — HYDROCODONE BITARTRATE AND ACETAMINOPHEN PRN TAB: 5; 325 TABLET ORAL at 02:45

## 2018-03-23 RX ADMIN — PHENYTOIN SODIUM SCH MLS/HR: 50 INJECTION INTRAMUSCULAR; INTRAVENOUS at 00:24

## 2018-03-23 RX ADMIN — CLINDAMYCIN PHOSPHATE SCH MLS/HR: 900 INJECTION, SOLUTION INTRAVENOUS at 02:43

## 2018-03-23 RX ADMIN — Medication SCH ML: at 09:00

## 2018-03-23 RX ADMIN — IBUPROFEN PRN MG: 200 TABLET, FILM COATED ORAL at 09:46

## 2018-03-23 RX ADMIN — CLINDAMYCIN PHOSPHATE SCH MLS/HR: 900 INJECTION, SOLUTION INTRAVENOUS at 12:05

## 2018-03-23 NOTE — HHI.DS
__________________________________________________





Discharge Summary


Admission Date


Mar 21, 2018 at 23:57


Discharge Date:  Mar 23, 2018


Admitting Diagnosis





CVA





(1) Mass of right side of neck


ICD Code:  R22.1 - Localized swelling, mass and lump, neck


(2) CVA (cerebral vascular accident)


ICD Code:  I63.9 - Cerebral infarction, unspecified


Status:  Acute


(3) Pain, dental


ICD Code:  K08.89 - Other specified disorders of teeth and supporting structures


Status:  Acute


Procedures


No procedures


Brief History - From Admission


62-year-old male with history of hyperlipidemia who presented to the emergency 

room with complaints of slurred speech that started Saturday morning.  He 

denies any fever or chills.  He was evaluated by neurology Dr. Miller, 

imaging are normal, neurology recommends ENT evaluation.  CT soft tissue for 

further evaluation.  However patient received IV, contrast, and at this time he 

receiving IV fluids will have CT neck tomorrow.  Patient also had modified 

barium test and was normal he can eat regular food at this time.


Patient denies any change in vision, problem with respiration, chest pain, 

shortness of breath, palpitations.  No nausea or vomiting no diarrhea or 

constipation.


Patient is saturating well on room air.  There is no airway compromise and he 

is able to eat this time.


CBC/BMP:  


3/21/18 2050                                                                   

             3/21/18 2050





Significant Findings





Laboratory Tests








Test


  3/21/18


20:50 3/22/18


04:55 3/22/18


10:45


 


Red Blood Count


  4.43 MIL/MM3


(4.50-5.90) 


  


 


 


Platelet Count


  503 TH/MM3


(150-450) 


  


 


 


Mean Platelet Volume


  6.4 FL


(7.0-11.0) 


  


 


 


Blood Urea Nitrogen 6 MG/DL (7-18)   


 


Calcium Level


  8.0 MG/DL


(8.5-10.1) 


  


 


 


Sodium Level


  132 MEQ/L


(136-145) 


  


 


 


Troponin I


  LESS THAN 0.02


NG/ML 


  


 


 


HDL Cholesterol


  


  65.4 MG/DL


(40.0-60.0) 


 








Imaging





Last Impressions








Neck CT 3/22/18 0000 Signed





Impressions: 





 Service Date/Time:  Thursday, March 22, 2018 22:14 - CONCLUSION:  1. Posterior 





 right lingual mass with margins not clearly defined on CT. This area is 





 partially obscured by dental hardware. Direct visualization recommended.     





 Srinivasa Serna MD 


 


Brain MRI 3/22/18 0000 Signed





Impressions: 





 Service Date/Time:  Thursday, March 22, 2018 00:16 - CONCLUSION:  1. Negative 





 MRI of the brain.  No evidence of acute stroke.     Joseph Magallanes MD 


 


Neck CTA 3/21/18 2034 Signed





Impressions: 





 Service Date/Time:  Wednesday, March 21, 2018 22:06 - CONCLUSION:  1. No 





 stenosis seen in the carotids or left vertebral. 2. No contrast seen in the 





 right vertebral artery.     Joseph Magallanes MD 


 


Head CTA 3/21/18 2034 Signed





Impressions: 





 Service Date/Time:  Wednesday, March 21, 2018 22:06 - CONCLUSION:  Normal 





 examination for a patient of this age.       Srinivasa Serna MD 


 


Head CT 3/21/18 2034 Signed





Impressions: 





 Service Date/Time:  Wednesday, March 21, 2018 22:06 - CONCLUSION:  1. No acute 





 intracranial abnormalities.     Srinivasa Serna MD 


 


Chest X-Ray 3/21/18 2034 Signed





Impressions: 





 Service Date/Time:  Wednesday, March 21, 2018 21:00 - CONCLUSION:  1. No acute 





 findings. Calcified granuloma left midlung.     Srinivasa Serna MD 








PE at Discharge


GENERAL: This is a well-nourished, well-developed patient, in no apparent 

distress.


CARDIOVASCULAR: Regular rate and rhythm without murmurs, gallops, or rubs. 


RESPIRATORY: Clear to auscultation. Breath sounds equal bilaterally. No wheezes

, rales, or rhonchi.  


GASTROINTESTINAL: Abdomen soft, non-tender, nondistended. No hepato-splenomegaly

, or palpable masses. No guarding.


MUSCULOSKELETAL: Extremities without clubbing, cyanosis, or edema. No joint 

tenderness, effusion, or edema noted. No calf tenderness. Negative Homans sign 

bilaterally.


NEUROLOGICAL: Awake and alert. Cranial nerves II through XII intact.  Motor and 

sensory grossly within normal limits. Five out of 5 muscle strength in all 

muscle groups.  Normal speech.





Pt update on day of discharge


Feels much better.  Pain in his neck is controlled by medications.  Says he is 

able to swallow without any problems, passed swallow evaluation modified 

barium.  He is also saturating well on room air.  Cleared by ENT for discharge.

  To follow-up as outpatient with ENT, PCP


Hospital Course


62-year-old male





Slurred speech on admission/Odynophagia/dysphagia. Resolving. 


Tongue deviation to the right, possible right neck mass





CT head/ MRI/MRA head reviewed and findings discussed with neurology Dr Miller no evidence of acute ischemic stroke


Plan to evaluate right neck  with CT neck with contrast and cant be done today 

as patient had already contrast today. Continue IVF. 


Past swallow test and patient can have regular food


Neurochecks


Hyperlipidemia continue home medication


Pain medications per pain scale


Started on clyndamycin IV , switch to PO 





Had  CT neck patient has a posterior lingual mass on right side needs direct 

evaluation by ENT as patient also has dental hardware and not visualized wellon 

CT. Discussed with Dr Dee 3/22/18 he recommends to follow up as oP on Tues 

day in his office. Discussed with family and patient who agrees to the palna. 

Also requested radiologic clarice at [facility]. Discussed with Dr Dee  ENT can follow 

up with him  OP on Tuesday in his office.


Pt Condition on Discharge:  Stable


Discharge Disposition:  Discharge Home


Discharge Time:  > 30 minutes


Discharge Instructions


DIET: Follow Instructions for:  As Tolerated, No Restrictions


Activities you can perform:  Regular-No Restrictions


Follow up Referrals:  


Ear Nose Throat - 03/27/18 with Prasad Dee MD


PCP Follow-up - 2-3 Days





New Medications:  


Clindamycin (Clindamycin) 300 Mg Cap


300 MG PO TID for Infection for 7 Days, CAP 0 Refills





Docusate Sodium (Docusate Sodium) 100 Mg Cap


100 MG PO BID PRN for CONSTIPATION, #60 CAP 0 Refills





Hydrocodone-Acetaminophen (Norco) 5 Mg-325 Mg Tab


1 TAB PO Q6H PRN for PAIN, #10 TAB 0 Refills





Lactobacillus Acidophilus (Lactinex) 1 Chew


1 TAB CHEW DAILY for Nutritional Supplement, #30 TAB 0 Refills





 


Continued Medications:  


Pravastatin (Pravastatin) 80 Mg Tab


80 MG PO DAILY for Cholesterol Management, #30 TAB 0 Refills

















Yanni Garza MD Mar 23, 2018 08:36

## 2018-03-23 NOTE — HHI.PR
Objective





Vital Signs








  Date Time  Temp Pulse Resp B/P (MAP) Pulse Ox O2 Delivery O2 Flow Rate FiO2


 


3/23/18 04:00 98.5 58  109/65 (80) 95   


 


3/23/18 04:00  58      


 


3/23/18 00:00  64      


 


3/23/18 00:00 98.3 64 13 115/65 (82) 95   


 


3/22/18 20:16     98   21


 


3/22/18 20:00  74      


 


3/22/18 19:05 97.9 78 19 128/70 (89) 96   


 


3/22/18 16:00  70      


 


3/22/18 16:00 98.2 70 12 114/81 (92) 97   


 


3/22/18 12:00 99.3 80 21 133/86 (102) 97   


 


3/22/18 12:00  80      


 


3/22/18 08:00 97.9 72 14 130/82 (98) 96   


 


3/22/18 08:00  72      














I/O      


 


 3/22/18 3/22/18 3/22/18 3/23/18 3/23/18 3/23/18





 07:00 15:00 23:00 07:00 15:00 23:00


 


Intake Total 50 ml  570 ml 1500 ml  


 


Output Total 450 ml   2150 ml  


 


Balance -400 ml  570 ml -650 ml  


 


      


 


Intake Oral   520 ml   


 


IV Total 50 ml  50 ml 1500 ml  


 


Output Urine Total 450 ml   2150 ml  


 


# Voids   4   


 


# Bowel Movements   1   








Result Diagram:  


3/21/18 2050                                                                   

             3/21/18 2050








Assessment and Plan


Assessment and Plan


imp





ct shows some r tongue mass


need ent





labs ok


mbs pend





non neurological here











Patricio Miller MD Mar 23, 2018 07:52

## 2018-03-30 NOTE — RADRPT
EXAM DATE/TIME:  03/22/2018 00:00 

 

[facility] COMPARISON:     

No previous studies available for comparison.

 

                     

INDICATIONS :     

Difficulty swallowing solids.

                     

 

FLUORO TIME:     

1.3 minutes

 

IMAGE COUNT:     

0

                     

 

CONTRAST:     

Dose as prescribed by speech pathologist.

                     

 

MEDICAL HISTORY :     

None.          

 

SURGICAL HISTORY :     

Cholecystectomy.   hernia repair

 

ENCOUNTER:     

Subsequent                                        

 

ACUITY:     

2 days      

 

PAIN SCORE:     

0/10

 

LOCATION:       

esophagus

 

FINDINGS:     

A modified barium swallow was performed with speech pathology. Patient was given a variety of liquids
 to swallow.

 

Patient demonstrated some difficulty propelling the food bolus to the posterior oropharynx. Otherwise
, patient handled the various consistencies of barium contrast without difficulty. No aspiration. Nor
mal pharyngeal motion during deglutition. 

 

For a full detailed report, see report by the speech pathologist.

 

CONCLUSION:     Modified barium swallow as above. No aspiration.

 

 

 

 Joseph Rojo MD on March 30, 2018 at 8:47           

Board Certified Radiologist.

 This report was verified electronically.